# Patient Record
Sex: FEMALE | Race: WHITE | NOT HISPANIC OR LATINO | URBAN - METROPOLITAN AREA
[De-identification: names, ages, dates, MRNs, and addresses within clinical notes are randomized per-mention and may not be internally consistent; named-entity substitution may affect disease eponyms.]

---

## 2021-02-20 ENCOUNTER — TRANSFERRED RECORDS (OUTPATIENT)
Dept: HEALTH INFORMATION MANAGEMENT | Facility: CLINIC | Age: 56
End: 2021-02-20

## 2021-02-23 ENCOUNTER — TRANSFERRED RECORDS (OUTPATIENT)
Dept: HEALTH INFORMATION MANAGEMENT | Facility: CLINIC | Age: 56
End: 2021-02-23

## 2021-04-28 ENCOUNTER — TRANSCRIBE ORDERS (OUTPATIENT)
Dept: OTHER | Age: 56
End: 2021-04-28

## 2021-04-28 ENCOUNTER — MEDICAL CORRESPONDENCE (OUTPATIENT)
Dept: HEALTH INFORMATION MANAGEMENT | Facility: CLINIC | Age: 56
End: 2021-04-28

## 2021-04-28 DIAGNOSIS — M79.661 PAIN OF RIGHT LOWER LEG: ICD-10-CM

## 2021-04-28 DIAGNOSIS — M53.88 OTHER SPECIFIED DORSOPATHIES, SACRAL AND SACROCOCCYGEAL REGION: ICD-10-CM

## 2021-04-28 DIAGNOSIS — M54.17 RADICULOPATHY, LUMBOSACRAL REGION: ICD-10-CM

## 2021-04-28 DIAGNOSIS — M53.3 SACROCOCCYGEAL DISORDERS, NOT ELSEWHERE CLASSIFIED: Primary | ICD-10-CM

## 2021-04-30 NOTE — TELEPHONE ENCOUNTER
RECORDS RECEIVED FROM: Sacrococcygeal disorders; Radiculopathy, lumbosacral region; Pain of right lower leg/images not sent/Van [email or web address]   DATE RECEIVED: Jun 9, 2021     NOTES STATUS DETAILS   OFFICE NOTE from referring provider Received    OFFICE NOTE from other specialist N/A    DISCHARGE SUMMARY from hospital N/A    DISCHARGE REPORT from the ER N/A    OPERATIVE REPORT N/A    MEDICATION LIST Internal    IMPLANT RECORD/STICKER N/A    LABS     CBC/DIFF N/A    CULTURES N/A    INJECTIONS DONE IN RADIOLOGY N/A    MRI In process    CT SCAN In process    XRAYS (IMAGES & REPORTS) In process    TUMOR     PATHOLOGY  Slides & report N/A    06/21/21   10:45 AM   IMAGES IN PACS  COMPLETE  Ivania Tavarez CMA    05/26/21   10:17 AM   CALLED [facility] FOR IMAGES  FAXED REQUEST TO [facility]CAUSE [facility] BUT THE REPORT SAYS [facility] [phone]  CALLED [facility], THEY ONLY HAVE 1 XR THEY WILL PUSH  Ivania Tavarez CMA      05/05/21   1:36 PM   CALLED [facility] FOR IMAGES  Ivania Tavarez CMA

## 2021-06-09 ENCOUNTER — PRE VISIT (OUTPATIENT)
Dept: ORTHOPEDICS | Facility: CLINIC | Age: 56
End: 2021-06-09

## 2021-06-11 ENCOUNTER — TELEPHONE (OUTPATIENT)
Dept: ORTHOPEDICS | Facility: CLINIC | Age: 56
End: 2021-06-11

## 2021-06-11 DIAGNOSIS — M54.17 LUMBOSACRAL RADICULOPATHY: ICD-10-CM

## 2021-06-11 DIAGNOSIS — M53.3 SACROCOCCYGEAL DISORDERS, NOT ELSEWHERE CLASSIFIED: Primary | ICD-10-CM

## 2021-06-11 NOTE — TELEPHONE ENCOUNTER
Writer called and left pt a voice message to see if that is correct and wanting to cancel apt with Dr. Aguayo.     Christina Sloan LPN

## 2021-06-11 NOTE — TELEPHONE ENCOUNTER
Writer called and talked with pt on the phone. Writer confirmed with pt that they are keeping the 6/23/21 apt. Writer informed pt of the records and imaging still needed. Pt states that they will work on getting the records sent to the clinic and or bring with to apt.     Christina Sloan LPN

## 2021-06-11 NOTE — TELEPHONE ENCOUNTER
Health Call Center    Phone Message    May a detailed message be left on voicemail: yes     Reason for Call: Other: Patient missed call from isabella or emily about appt with Dr Aguayo. Patient stated that she did the have the procedure schedueld in [facility] but did cancel it as she wants to meet with Dr Aguayo for a second opinion.     Action Taken: Message routed to:  [facility]o    Travel Screening: Not Applicable

## 2021-06-23 ENCOUNTER — ANCILLARY PROCEDURE (OUTPATIENT)
Dept: GENERAL RADIOLOGY | Facility: CLINIC | Age: 56
End: 2021-06-23
Attending: ORTHOPAEDIC SURGERY
Payer: COMMERCIAL

## 2021-06-23 ENCOUNTER — OFFICE VISIT (OUTPATIENT)
Dept: ORTHOPEDICS | Facility: CLINIC | Age: 56
End: 2021-06-23
Payer: COMMERCIAL

## 2021-06-23 VITALS — HEIGHT: 66 IN | WEIGHT: 204.6 LBS | BODY MASS INDEX: 32.88 KG/M2

## 2021-06-23 DIAGNOSIS — M53.88 OTHER SPECIFIED DORSOPATHIES, SACRAL AND SACROCOCCYGEAL REGION: ICD-10-CM

## 2021-06-23 DIAGNOSIS — M79.661 PAIN OF RIGHT LOWER LEG: ICD-10-CM

## 2021-06-23 DIAGNOSIS — M54.17 RADICULOPATHY, LUMBOSACRAL REGION: ICD-10-CM

## 2021-06-23 DIAGNOSIS — M53.3 SACROCOCCYGEAL DISORDERS, NOT ELSEWHERE CLASSIFIED: Primary | ICD-10-CM

## 2021-06-23 DIAGNOSIS — M53.3 SACROCOCCYGEAL DISORDERS, NOT ELSEWHERE CLASSIFIED: ICD-10-CM

## 2021-06-23 PROCEDURE — 72082 X-RAY EXAM ENTIRE SPI 2/3 VW: CPT | Performed by: STUDENT IN AN ORGANIZED HEALTH CARE EDUCATION/TRAINING PROGRAM

## 2021-06-23 PROCEDURE — 99203 OFFICE O/P NEW LOW 30 MIN: CPT | Mod: GC | Performed by: ORTHOPAEDIC SURGERY

## 2021-06-23 PROCEDURE — 72220 X-RAY EXAM SACRUM TAILBONE: CPT | Performed by: RADIOLOGY

## 2021-06-23 PROCEDURE — 77073 BONE LENGTH STUDIES: CPT | Performed by: STUDENT IN AN ORGANIZED HEALTH CARE EDUCATION/TRAINING PROGRAM

## 2021-06-23 RX ORDER — CLOBETASOL PROPIONATE 0.5 MG/G
CREAM TOPICAL 2 TIMES DAILY
COMMUNITY
Start: 2021-01-22

## 2021-06-23 RX ORDER — MULTIPLE VITAMINS W/ MINERALS TAB 9MG-400MCG
1 TAB ORAL
COMMUNITY

## 2021-06-23 RX ORDER — LORAZEPAM 0.5 MG/1
0.5 TABLET ORAL PRN
COMMUNITY

## 2021-06-23 RX ORDER — METOPROLOL SUCCINATE 100 MG/1
100 TABLET, EXTENDED RELEASE ORAL DAILY
COMMUNITY
Start: 2020-07-26 | End: 2021-07-26

## 2021-06-23 RX ORDER — FLECAINIDE ACETATE 100 MG/1
1 TABLET ORAL DAILY
COMMUNITY
Start: 2020-12-22

## 2021-06-23 RX ORDER — OMEGA-3-ACID ETHYL ESTERS 900 MG/1
1 CAPSULE, LIQUID FILLED ORAL
COMMUNITY

## 2021-06-23 RX ORDER — GABAPENTIN 100 MG/1
100 CAPSULE ORAL PRN
COMMUNITY
Start: 2021-03-05

## 2021-06-23 RX ORDER — LOSARTAN POTASSIUM 25 MG/1
25 TABLET ORAL DAILY
COMMUNITY
Start: 2020-09-16 | End: 2021-09-16

## 2021-06-23 RX ORDER — CLINDAMYCIN PHOSPHATE 10 MG/G
GEL TOPICAL DAILY
COMMUNITY

## 2021-06-23 RX ORDER — ASPIRIN 81 MG/1
81 TABLET, CHEWABLE ORAL DAILY
COMMUNITY

## 2021-06-23 RX ORDER — HYDROCHLOROTHIAZIDE 25 MG/1
1 TABLET ORAL DAILY
COMMUNITY
Start: 2020-12-22 | End: 2021-12-22

## 2021-06-23 RX ORDER — DULOXETIN HYDROCHLORIDE 20 MG/1
20 CAPSULE, DELAYED RELEASE ORAL DAILY
COMMUNITY

## 2021-06-23 ASSESSMENT — MIFFLIN-ST. JEOR: SCORE: 1552.06

## 2021-06-23 NOTE — LETTER
Date:July 1, 2021      Patient was self referred, no letter generated. Do not send.        [facility] Health Information

## 2021-06-23 NOTE — LETTER
6/23/2021         RE: Emy Sherwood  [address] [place] [address]        Dear Colleague,    Thank you for referring your patient, Emy Sherwood, to the [facility] [place]. Please see a copy of my visit note below.    Spine Surgery Consultation    REFERRING PHYSICIAN: No ref. provider found   PRIMARY CARE PHYSICIAN: No primary care provider on file.           Chief Complaint:   No chief complaint on file.      History of Present Illness:  Symptom Profile Including: location of symptoms, onset, severity, exacerbating/alleviating factors, previous treatments:        Emy Sherwood is a 55 year old female who presents with pain at her coccyx for approximately 17 years.  Patient says she had a fall approximately 17 years ago to going down the stairs that started this pain initially.  Since then she has had intermittent pain.  Pain is made worse with sitting on hard surfaces.  She denies any numbness or tingling in the lower extremities.  She denies any weakness in the lower extremities.    Previous treatments  NSAIDS/tylenol: She takes ibuprofen intermittently, 800 mg twice a day when needed  Has tried to use a donut, it does not help her.  Cushion seats have been the most helpful.  She had a ganglion impar block approximately 1 year ago, she did not find any relief from that.  Patient has had 3 sacrococcygeal joint injections, for the first 2 injections she found relief of symptoms for approximately 4 months.  The last injection did not help her as a result she tried the impar injection.  These injections were done with Dr. Van Bee in [facility]  Patient has previously tried physical therapy for approximately 2 months, which did not help relieve her symptoms.  Physical therapy was done in [facility]         Past Medical History:   Prediabetic  History of A. fib, controlled medications, not on anticoagulation medication, no history of  previous clots.         Past Surgical History:   No previous history of spine surgeries         Social History:     Social History     Tobacco Use     Smoking status: Not on file   Substance Use Topics     Alcohol use: Not on file   Denies smoking  Intermittent alcohol use         Family History:   No family history on file.         Allergies:   Not on File         Medications:     No current outpatient medications on file.     No current facility-administered medications for this visit.              Review of Systems:     A 10 point ROS was performed and reviewed. Specific responses to these questions are noted at the end of the document.         Physical Exam:   Vitals: There were no vitals taken for this visit.  Constitutional: awake, alert, cooperative, no apparent distress, appears stated age.    Eyes: The sclera are white.  Ears, Nose, Throat: The trachea is midline.  Psychiatric: The patient has a normal affect.  Respiratory: breathing non-labored  Cardiovascular: The extremities are warm and perfused.  Skin: no obvious rashes or lesions.  Musculoskeletal, Neurologic, and Spine:     Skin is intact no obvious wounds are noted.      Lumbar Spine:    Appearance - No gross stepoffs or deformities    Normal gait without assistive device.  No antalgia / imbalance.    Motor -     L2-3: Hip flexion R 5/5  And L 5/5 strength          L3/4:  Knee extension R 5/5 and L 5/5 strength         L4/5:  Foot dorsiflexion R 5/5 L 5/5 and       EHL dorsiflexion R 4/5 L 4/5 strength         S1:  Plantarflexion/Peroneal Muscles  R 5/5 and L 5/5 strength    Sensation: intact to light touch L3-S1 distribution BLE      Alignment:  Patient stands with a neutral standing sagittal balance.  No scoliotic curves grossly evident    Palpation of the sacrum is nontender on rectal exam.  Mobilization of the sacrococcygeal joint did not reproduce her symptoms on rectal exam.         Imaging:   We ordered and independently reviewed new  radiographs at this clinic visit. The results were discussed with the patient.  Findings include:    XR of the sacrum menstruates a flexed sacrococcygeal joint.  No acute fractures are noted.               Assessment and Plan:   Assessment:  55 year old female presents with coccyx pain likely secondary to traumatic disruption of the sacrococcygeal joint.    Patient's history and response to sacrococcygeal joint injections is consistent with likely a sacrococcygeal joint traumatic disruption however symptoms could not be reproduced.  At this time we do not have imaging of previous sacrococcygeal joint injections.  In order further clarify the diagnosis, recommend patient obtain fluoroscopy guided sacrococcygeal injection to assess and confirm the etiology of her pain.     Plan:  1. Referral for imaging guided sacrococcygeal injection was provided at [facility]  2. Patient will return to Dr. Aguayo's clinic after her injection.    Patient seen and discussed with , Who is in agreement with the plan noted above.    René Davis, PGY4    I saw and evaluated the patient and developed the plan.  Marvin Aguayo MD        Again, thank you for allowing me to participate in the care of your patient.        Sincerely,        Marvni Aguayo MD

## 2021-06-23 NOTE — NURSING NOTE
"Reason For Visit:   Chief Complaint   Patient presents with     Consult     Sacrococcygeal disorders; Radiculopathy, lumbosacral region; Pain of right lower leg/images not sent/[email or web address]       Primary MD: Adriane Michelle  Ref. MD: [email or web address] Bee [email or web address]    ?  No  Occupation .   Currently working? Yes.  Work status?  Full time.    Date of injury: 17 years ago  Type of injury: fell down stairs.    Date of surgery: no  Type of surgery: no.    Smoker: No  Request smoking cessation information: no    Ht 1.68 m (5' 6.14\")   Wt 92.8 kg (204 lb 9.6 oz)   BMI 32.88 kg/m      Pain Assessment  Patient Currently in Pain: Yes  0-10 Pain Scale: 4  Primary Pain Location: Back    Oswestry (ZULEYKA) Questionnaire    OSWESTRY DISABILITY INDEX 6/23/2021   Count 9   Sum 13   Oswestry Score (%) 28.89          Visual Analog Pain Scale  Back Pain Scale 0-10: 4  Right leg pain: 0  Left leg pain: 0  Neck Pain Scale 0-10: 0  Right arm pain: 0  Left arm pain: 0    Promis 10 Assessment    PROMIS 10 6/23/2021   In general, would you say your health is: Good   In general, would you say your quality of life is: Very good   In general, how would you rate your physical health? Good   In general, how would you rate your mental health, including your mood and your ability to think? Good   In general, how would you rate your satisfaction with your social activities and relationships? Good   In general, please rate how well you carry out your usual social activities and roles Fair   To what extent are you able to carry out your everyday physical activities such as walking, climbing stairs, carrying groceries, or moving a chair? Completely   How often have you been bothered by emotional problems such as feeling anxious, depressed or irritable? Often   How would you rate your fatigue on average? Moderate   How would you rate your pain on average?   0 = No Pain "  to  10 = Worst Imaginable Pain 4   In general, would you say your health is: 3   In general, would you say your quality of life is: 4   In general, how would you rate your physical health? 3   In general, how would you rate your mental health, including your mood and your ability to think? 3   In general, how would you rate your satisfaction with your social activities and relationships? 3   In general, please rate how well you carry out your usual social activities and roles. (This includes activities at home, at work and in your community, and responsibilities as a parent, child, spouse, employee, friend, etc.) 2   To what extent are you able to carry out your everyday physical activities such as walking, climbing stairs, carrying groceries, or moving a chair? 5   In the past 7 days, how often have you been bothered by emotional problems such as feeling anxious, depressed, or irritable? 4   In the past 7 days, how would you rate your fatigue on average? 3   In the past 7 days, how would you rate your pain on average, where 0 means no pain, and 10 means worst imaginable pain? 4   Global Mental Health Score 12   Global Physical Health Score 14   PROMIS TOTAL - SUBSCORES 26                Christina Sloan LPN

## 2021-06-23 NOTE — LETTER
[facility] [employer]  [address], [address]  [place] [address]  Fax: [phone]  Phone: [phone]      2021      Emy Sherwood  [address] [place] [address]        Dear Emy Sherwood,    Thank you for your interest in becoming a [employer] user!    Your access code is: [email or web address]  Expires: 2021  6:33 AM     Please access the [employer] website at [email or web address].  Below the ID and password fields, select the  Sign Up Now  as New User.  You will be prompted to enter the access code listed above as well as additional personal information.  Please follow the directions carefully when creating your username and password.    If you allow your access code to , or if you have any questions please call a [employer] Representative at [phone] during normal clinic hours.     Sincerely,      [employer]  [facility]

## 2021-06-24 ENCOUNTER — TELEPHONE (OUTPATIENT)
Dept: ORTHOPEDICS | Facility: CLINIC | Age: 56
End: 2021-06-24

## 2021-06-24 NOTE — TELEPHONE ENCOUNTER
RN called and spoke with [facility] . Turns out he order stated the procedure is going to take place in [place] and that has to be changed. RN told he will fax a new order with the cover sheet and also stated this procedure to take place in [facility]. Emy stated that will be great.    Benjamin Tavarez RN        [facility]    Phone Message:  [facility] from [place], [place] called to request an updated MRI Order that has [facility] or [facility] stated on it.      Please call [phone] with any questions or concerns.    Fax # for updated MRI Order addressed to [facility] or [facility]:  [phone]    May a detailed message be left on voicemail: N/A     Reason for Call: Other: MRI Order:  FAX     Action Taken: Message routed to:  [facility] Team    Travel Screening: Not Applicable

## 2021-06-27 ENCOUNTER — HEALTH MAINTENANCE LETTER (OUTPATIENT)
Age: 56
End: 2021-06-27

## 2021-06-29 ENCOUNTER — TELEPHONE (OUTPATIENT)
Dept: ORTHOPEDICS | Facility: CLINIC | Age: 56
End: 2021-06-29

## 2021-06-29 NOTE — TELEPHONE ENCOUNTER
[facility] Call Center    Phone Message    May a detailed message be left on voicemail: yes     Reason for Call: Other: Please fax MRI orders to [facility] fax is [phone]     Action Taken: Message routed to:  [facility] Ortho    Travel Screening: Not Applicable

## 2021-07-01 NOTE — TELEPHONE ENCOUNTER
See phone message.  I called [facility] back on 6-29-21 & confirmed they did receive our fax of injection order. We did not order an MRI.  They will kiki pt to schedule INjection. Emy Reynolds RN.

## 2021-07-13 ENCOUNTER — TELEPHONE (OUTPATIENT)
Dept: ORTHOPEDICS | Facility: CLINIC | Age: 56
End: 2021-07-13

## 2021-07-13 NOTE — TELEPHONE ENCOUNTER
"Returned call to Dr. Laboy for further information. He informed that they reviewed pt's imaging and found pt's coccyx to be a little superior. Due to this, Dr. Laboy will have to inject pt via fluoroscopy and angle the needle towards the \"annus\".  They checked-in with the pt and the pt stated that her pain is much better now. The pt would like to hold off the injection. [facility] informed Dr. Laboy that [facility] will relayed this to Dr. Aguayo's team.       Dr. Aguayo can call Dr. Laboy at [phone] with further questions.           -JB Matias- Orthopedics      "

## 2021-07-13 NOTE — TELEPHONE ENCOUNTER
See phone message & Triage [facility] note.     had also gotten notified by [facility] &  called & spoke to [facility] Radiologist who stated same as above note & that pt did not want to proceed with injection due to increased risk near the Anus.   will discuss with .    Call back prn.  Emy Reynolds RN.

## 2021-07-13 NOTE — TELEPHONE ENCOUNTER
[facility] Call Center    Phone Message    May a detailed message be left on voicemail: yes     Reason for Call: Other: Dr. Hoang Laboy from [facility] would like a call back from Dr. Aguayo regarding findings on CT sacrum.     TELEPHONE: [phone]    Action Taken: Message routed to:  [facility] ortho    Travel Screening: Not Applicable

## 2021-07-15 ENCOUNTER — TELEPHONE (OUTPATIENT)
Dept: ORTHOPEDICS | Facility: CLINIC | Age: 56
End: 2021-07-15

## 2021-07-15 DIAGNOSIS — M53.3 SACROCOCCYGEAL DISORDERS, NOT ELSEWHERE CLASSIFIED: Primary | ICD-10-CM

## 2021-07-16 NOTE — TELEPHONE ENCOUNTER
See phone message of 7-13-21.  See dictation.    Reviewed [facility] Radiologist/ discussion with  who ordered CT pelvis to eval. SacroCoccygeal Articulation & then RTC appt after to discuss plan.    I called pt who agreed with plan.  Ordered & scheduled both.  Informed Sky Zepeda to transfer [facility] imaging & report of aborted injection attempt.    Call back prn.  Pt agreed.    V.O.R.B./Emy Reynolds RN.

## 2021-08-25 ASSESSMENT — ENCOUNTER SYMPTOMS
MUSCLE WEAKNESS: 0
JOINT SWELLING: 0
ARTHRALGIAS: 1
MYALGIAS: 0
STIFFNESS: 1
BACK PAIN: 1
MUSCLE CRAMPS: 0
NECK PAIN: 1

## 2021-08-26 ENCOUNTER — ANCILLARY PROCEDURE (OUTPATIENT)
Dept: CT IMAGING | Facility: CLINIC | Age: 56
End: 2021-08-26
Attending: ORTHOPAEDIC SURGERY
Payer: COMMERCIAL

## 2021-08-26 ENCOUNTER — OFFICE VISIT (OUTPATIENT)
Dept: ORTHOPEDICS | Facility: CLINIC | Age: 56
End: 2021-08-26
Payer: COMMERCIAL

## 2021-08-26 VITALS — HEIGHT: 67 IN | BODY MASS INDEX: 32.49 KG/M2 | WEIGHT: 207 LBS

## 2021-08-26 DIAGNOSIS — M53.3 SACROCOCCYGEAL DISORDERS, NOT ELSEWHERE CLASSIFIED: ICD-10-CM

## 2021-08-26 DIAGNOSIS — M53.3 COCCYGODYNIA: Primary | ICD-10-CM

## 2021-08-26 PROCEDURE — 99214 OFFICE O/P EST MOD 30 MIN: CPT | Performed by: ORTHOPAEDIC SURGERY

## 2021-08-26 PROCEDURE — 72192 CT PELVIS W/O DYE: CPT | Performed by: RADIOLOGY

## 2021-08-26 ASSESSMENT — MIFFLIN-ST. JEOR: SCORE: 1572.32

## 2021-08-26 NOTE — NURSING NOTE
"Reason For Visit:   Chief Complaint   Patient presents with     RECHECK     CT Pelvis same day. F/U Aborted injection at [facility]. Coccyx pain        Primary MD: Adriane Michelle  Ref. MD: Est    ?  No  Occupation .   Currently working? Yes.  Work status?  Full time.     Date of injury: 17 years ago  Type of injury: fell down stairs.     Date of surgery: no  Type of surgery: no.     Smoker: No  Request smoking cessation information: no    Ht 1.695 m (5' 6.73\")   Wt 93.9 kg (207 lb)   BMI 32.68 kg/m      Pain Assessment  Patient Currently in Pain: Yes  0-10 Pain Scale: 3  Primary Pain Location: Coccyx    Oswestry (ZULEYKA) Questionnaire    OSWESTRY DISABILITY INDEX 8/25/2021   Count 9   Sum 6   Oswestry Score (%) 13.33      Visual Analog Pain Scale  Back Pain Scale 0-10: 3  Right leg pain: 0  Left leg pain: 0  Neck Pain Scale 0-10: 0  Right arm pain: 0  Left arm pain: 0    Promis 10 Assessment    PROMIS 10 8/25/2021   In general, would you say your health is: Good   In general, would you say your quality of life is: Very good   In general, how would you rate your physical health? Good   In general, how would you rate your mental health, including your mood and your ability to think? Good   In general, how would you rate your satisfaction with your social activities and relationships? Good   In general, please rate how well you carry out your usual social activities and roles Very good   To what extent are you able to carry out your everyday physical activities such as walking, climbing stairs, carrying groceries, or moving a chair? Mostly   How often have you been bothered by emotional problems such as feeling anxious, depressed or irritable? Sometimes   How would you rate your fatigue on average? Moderate   How would you rate your pain on average?   0 = No Pain  to  10 = Worst Imaginable Pain 3   In general, would you say your health is: 3   In general, would you say your quality of life is: 4 "   In general, how would you rate your physical health? 3   In general, how would you rate your mental health, including your mood and your ability to think? 3   In general, how would you rate your satisfaction with your social activities and relationships? 3   In general, please rate how well you carry out your usual social activities and roles. (This includes activities at home, at work and in your community, and responsibilities as a parent, child, spouse, employee, friend, etc.) 4   To what extent are you able to carry out your everyday physical activities such as walking, climbing stairs, carrying groceries, or moving a chair? 4   In the past 7 days, how often have you been bothered by emotional problems such as feeling anxious, depressed, or irritable? 3   In the past 7 days, how would you rate your fatigue on average? 3   In the past 7 days, how would you rate your pain on average, where 0 means no pain, and 10 means worst imaginable pain? 3   Global Mental Health Score 13   Global Physical Health Score 14   PROMIS TOTAL - SUBSCORES 27                Christina Sloan LPN

## 2021-08-26 NOTE — PROGRESS NOTES
HISTORY OF PRESENT ILLNESS:  Emy has had a complicated course of things.  She has a history of coccyx pain.  I did a manual examination of her coccyx, which hurt like the dickens and she has no interest in having that done again.  However, it has resulted in her having less pain than she had before.  She was seen in an outside facility for a CT-guided injection of her coccyx and the radiologist, Dr. Hoang Dickey, said that the trajectory was such that it would bring it to close to her anus and he was reluctant to perform it as he felt that her coccyx had moved anterior to her sacrum.  I think that was a reasonable consideration on his part, however, because of this, we have not been able to get the confirmatory injection.  Prior to the visit today, she had a noncontrast CT scan done which I reviewed with her today.  This shows what appears to be a gap in a nondisplaced coccyx and so it is a little unclear to me why it showed up the way it did to Dr. Osuna at [facility].  Emy also has provided a new message to me that she now has diabetes and is having to do fingersticks, etc.  We reviewed the CT scan together and I showed her the images.  I explained the situation to her.  I once again went through the scenario on the diagnosis and treatment of coccygodynia.  I explained that the presumptive diagnosis is made based on the rectal physical exam, which confirmed this in her and then is usually confirmed by the diagnostic injection of a local anesthetic into the sacrococcygeal joint, which she has not had done.  The same issue of the 10-20% risk of wound dehiscence, wound infection, wound complications persists.  Her body habitus and now her diabetes puts her at a little higher risk for this and with control of her environment she is actually doing better than she has in the past.  So, we reached the conclusion that right now, we are going to hold off on doing a coccygectomy and if her symptoms worsen, she is welcome to  return and then we will reassess the situation.  She is in agreement with this plan and wishes to move forward.    We also had a specific discussion about weight loss and how this could help her, both in terms of pain and potentially decrease complications from surgical intervention.  We specifically talked about semaglutide as a potential strategy for weight loss.  I wrote this down for her and she took it with her and will discuss it with her diabetologist as well.  Again, she will follow up on an as-needed basis.    Total face-to-face time for this patient, including review of the CT scan and demonstrating it to her along with reviewing the outside CT scan and the challenge of the prior attempted injection was 30 minutes.          Marvin Aguayo MD      Answers for HPI/ROS submitted by the patient on 8/25/2021  General Symptoms: No  Skin Symptoms: No  HENT Symptoms: No  EYE SYMPTOMS: No  HEART SYMPTOMS: No  LUNG SYMPTOMS: No  INTESTINAL SYMPTOMS: No  URINARY SYMPTOMS: No  GYNECOLOGIC SYMPTOMS: No  BREAST SYMPTOMS: No  SKELETAL SYMPTOMS: Yes  BLOOD SYMPTOMS: No  NERVOUS SYSTEM SYMPTOMS: No  MENTAL HEALTH SYMPTOMS: No  Back pain: Yes  Muscle aches: No  Neck pain: Yes  Swollen joints: No  Joint pain: Yes  Bone pain: No  Muscle cramps: No  Muscle weakness: No  Joint stiffness: Yes  Bone fracture: No

## 2021-08-26 NOTE — LETTER
8/26/2021         RE: Emy Sherwood  [address] [place] [address]        Dear Colleague,    Thank you for referring your patient, Emy Sherwood, to the [facility] [place]. Please see a copy of my visit note below.    HISTORY OF PRESENT ILLNESS:  Emy has had a complicated course of things.  She has a history of coccyx pain.  I did a manual examination of her coccyx, which hurt like the dickens and she has no interest in having that done again.  However, it has resulted in her having less pain than she had before.  She was seen in an outside facility for a CT-guided injection of her coccyx and the radiologist, Dr. Hoang Dickey, said that the trajectory was such that it would bring it to close to her anus and he was reluctant to perform it as he felt that her coccyx had moved anterior to her sacrum.  I think that was a reasonable consideration on his part, however, because of this, we have not been able to get the confirmatory injection.  Prior to the visit today, she had a noncontrast CT scan done which I reviewed with her today.  This shows what appears to be a gap in a nondisplaced coccyx and so it is a little unclear to me why it showed up the way it did to Dr. Osuna at [facility].  Emy also has provided a new message to me that she now has diabetes and is having to do fingersticks, etc.  We reviewed the CT scan together and I showed her the images.  I explained the situation to her.  I once again went through the scenario on the diagnosis and treatment of coccygodynia.  I explained that the presumptive diagnosis is made based on the rectal physical exam, which confirmed this in her and then is usually confirmed by the diagnostic injection of a local anesthetic into the sacrococcygeal joint, which she has not had done.  The same issue of the 10-20% risk of wound dehiscence, wound infection, wound complications persists.  Her body habitus and now her diabetes puts  her at a little higher risk for this and with control of her environment she is actually doing better than she has in the past.  So, we reached the conclusion that right now, we are going to hold off on doing a coccygectomy and if her symptoms worsen, she is welcome to return and then we will reassess the situation.  She is in agreement with this plan and wishes to move forward.    We also had a specific discussion about weight loss and how this could help her, both in terms of pain and potentially decrease complications from surgical intervention.  We specifically talked about semaglutide as a potential strategy for weight loss.  I wrote this down for her and she took it with her and will discuss it with her diabetologist as well.  Again, she will follow up on an as-needed basis.    Total face-to-face time for this patient, including review of the CT scan and demonstrating it to her along with reviewing the outside CT scan and the challenge of the prior attempted injection was 30 minutes.          Marvin Aguayo MD      Answers for HPI/ROS submitted by the patient on 8/25/2021  General Symptoms: No  Skin Symptoms: No  HENT Symptoms: No  EYE SYMPTOMS: No  HEART SYMPTOMS: No  LUNG SYMPTOMS: No  INTESTINAL SYMPTOMS: No  URINARY SYMPTOMS: No  GYNECOLOGIC SYMPTOMS: No  BREAST SYMPTOMS: No  SKELETAL SYMPTOMS: Yes  BLOOD SYMPTOMS: No  NERVOUS SYSTEM SYMPTOMS: No  MENTAL HEALTH SYMPTOMS: No  Back pain: Yes  Muscle aches: No  Neck pain: Yes  Swollen joints: No  Joint pain: Yes  Bone pain: No  Muscle cramps: No  Muscle weakness: No  Joint stiffness: Yes  Bone fracture: No

## 2021-10-17 ENCOUNTER — HEALTH MAINTENANCE LETTER (OUTPATIENT)
Age: 56
End: 2021-10-17

## 2022-07-24 ENCOUNTER — HEALTH MAINTENANCE LETTER (OUTPATIENT)
Age: 57
End: 2022-07-24

## 2022-10-03 ENCOUNTER — HEALTH MAINTENANCE LETTER (OUTPATIENT)
Age: 57
End: 2022-10-03

## 2023-08-12 ENCOUNTER — HEALTH MAINTENANCE LETTER (OUTPATIENT)
Age: 58
End: 2023-08-12

## 2024-06-18 NOTE — PROGRESS NOTES
Spine Surgery Consultation    REFERRING PHYSICIAN: No ref. provider found   PRIMARY CARE PHYSICIAN: No primary care provider on file.           Chief Complaint:   No chief complaint on file.      History of Present Illness:  Symptom Profile Including: location of symptoms, onset, severity, exacerbating/alleviating factors, previous treatments:        Emy Sherwood is a 55 year old female who presents with pain at her coccyx for approximately 17 years.  Patient says she had a fall approximately 17 years ago to going down the stairs that started this pain initially.  Since then she has had intermittent pain.  Pain is made worse with sitting on hard surfaces.  She denies any numbness or tingling in the lower extremities.  She denies any weakness in the lower extremities.    Previous treatments  NSAIDS/tylenol: She takes ibuprofen intermittently, 800 mg twice a day when needed  Has tried to use a donut, it does not help her.  Cushion seats have been the most helpful.  She had a ganglion impar block approximately 1 year ago, she did not find any relief from that.  Patient has had 3 sacrococcygeal joint injections, for the first 2 injections she found relief of symptoms for approximately 4 months.  The last injection did not help her as a result she tried the impar injection.  These injections were done with Dr. Van Bee in [facility]  Patient has previously tried physical therapy for approximately 2 months, which did not help relieve her symptoms.  Physical therapy was done in [facility]         Past Medical History:   Prediabetic  History of A. fib, controlled medications, not on anticoagulation medication, no history of previous clots.         Past Surgical History:   No previous history of spine surgeries         Social History:     Social History     Tobacco Use     Smoking status: Not on file   Substance Use Topics     Alcohol use: Not on file   Denies smoking  Intermittent alcohol  use         Family History:   No family history on file.         Allergies:   Not on File         Medications:     No current outpatient medications on file.     No current facility-administered medications for this visit.              Review of Systems:     A 10 point ROS was performed and reviewed. Specific responses to these questions are noted at the end of the document.         Physical Exam:   Vitals: There were no vitals taken for this visit.  Constitutional: awake, alert, cooperative, no apparent distress, appears stated age.    Eyes: The sclera are white.  Ears, Nose, Throat: The trachea is midline.  Psychiatric: The patient has a normal affect.  Respiratory: breathing non-labored  Cardiovascular: The extremities are warm and perfused.  Skin: no obvious rashes or lesions.  Musculoskeletal, Neurologic, and Spine:     Skin is intact no obvious wounds are noted.      Lumbar Spine:    Appearance - No gross stepoffs or deformities    Normal gait without assistive device.  No antalgia / imbalance.    Motor -     L2-3: Hip flexion R 5/5  And L 5/5 strength          L3/4:  Knee extension R 5/5 and L 5/5 strength         L4/5:  Foot dorsiflexion R 5/5 L 5/5 and       EHL dorsiflexion R 4/5 L 4/5 strength         S1:  Plantarflexion/Peroneal Muscles  R 5/5 and L 5/5 strength    Sensation: intact to light touch L3-S1 distribution BLE      Alignment:  Patient stands with a neutral standing sagittal balance.  No scoliotic curves grossly evident    Palpation of the sacrum is nontender on rectal exam.  Mobilization of the sacrococcygeal joint did not reproduce her symptoms on rectal exam.         Imaging:   We ordered and independently reviewed new radiographs at this clinic visit. The results were discussed with the patient.  Findings include:    XR of the sacrum menstruates a flexed sacrococcygeal joint.  No acute fractures are noted.               Assessment and Plan:   Assessment:  55 year old female presents with  coccyx pain likely secondary to traumatic disruption of the sacrococcygeal joint.    Patient's history and response to sacrococcygeal joint injections is consistent with likely a sacrococcygeal joint traumatic disruption however symptoms could not be reproduced.  At this time we do not have imaging of previous sacrococcygeal joint injections.  In order further clarify the diagnosis, recommend patient obtain fluoroscopy guided sacrococcygeal injection to assess and confirm the etiology of her pain.     Plan:  1. Referral for imaging guided sacrococcygeal injection was provided at [facility]  2. Patient will return to Dr. Aguayo's clinic after her injection.    Patient seen and discussed with , Who is in agreement with the plan noted above.    René Davis, PGY4    I saw and evaluated the patient and developed the plan.  Marvin Aguayo MD     Name And Contact Information For Health Care Proxy: Dagoberto Rebekah - [phone] Quality 47: Advance Care Plan: Advance Care Planning discussed and documented; advance care plan or surrogate decision maker documented in the medical record. Quality 226: Preventive Care And Screening: Tobacco Use: Screening And Cessation Intervention: Patient screened for tobacco use and is an ex/non-smoker Quality 137: Melanoma: Continuity Of Care - Recall System: Patient information entered into a recall system that includes: target date for the next exam specified AND a process to follow up with patients regarding missed or unscheduled appointments Detail Level: Detailed

## 2024-07-27 ENCOUNTER — HEALTH MAINTENANCE LETTER (OUTPATIENT)
Age: 59
End: 2024-07-27